# Patient Record
Sex: FEMALE | Race: WHITE
[De-identification: names, ages, dates, MRNs, and addresses within clinical notes are randomized per-mention and may not be internally consistent; named-entity substitution may affect disease eponyms.]

---

## 2024-06-17 ENCOUNTER — APPOINTMENT (OUTPATIENT)
Dept: COLORECTAL SURGERY | Facility: CLINIC | Age: 49
End: 2024-06-17
Payer: COMMERCIAL

## 2024-06-17 VITALS
DIASTOLIC BLOOD PRESSURE: 73 MMHG | TEMPERATURE: 98 F | SYSTOLIC BLOOD PRESSURE: 140 MMHG | WEIGHT: 110 LBS | OXYGEN SATURATION: 99 % | BODY MASS INDEX: 20.24 KG/M2 | HEIGHT: 62 IN | RESPIRATION RATE: 16 BRPM | HEART RATE: 79 BPM

## 2024-06-17 DIAGNOSIS — Z78.9 OTHER SPECIFIED HEALTH STATUS: ICD-10-CM

## 2024-06-17 DIAGNOSIS — L29.0 PRURITUS ANI: ICD-10-CM

## 2024-06-17 DIAGNOSIS — K62.89 OTHER SPECIFIED DISEASES OF ANUS AND RECTUM: ICD-10-CM

## 2024-06-17 DIAGNOSIS — K60.2 ANAL FISSURE, UNSPECIFIED: ICD-10-CM

## 2024-06-17 PROBLEM — Z00.00 ENCOUNTER FOR PREVENTIVE HEALTH EXAMINATION: Status: ACTIVE | Noted: 2024-06-17

## 2024-06-17 PROCEDURE — 99205 OFFICE O/P NEW HI 60 MIN: CPT | Mod: 25

## 2024-06-17 PROCEDURE — 46600 DIAGNOSTIC ANOSCOPY SPX: CPT

## 2024-06-17 RX ORDER — HYDROCORTISONE 25 MG/G
2.5 CREAM TOPICAL
Qty: 1 | Refills: 3 | Status: ACTIVE | COMMUNITY
Start: 2024-06-17 | End: 1900-01-01

## 2024-06-17 NOTE — HISTORY OF PRESENT ILLNESS
[FreeTextEntry1] : 48 year old female presents with rectal pain x1 year. She states the pain is intermittent but has been severe the last couple of weeks. Patient states the pain is aggravated if she touches the area. She also reports burning if urine reaches the area. She thinks there may also be swelling. Patient denies any bleeding. She denies any constipation. +daily BM.   No history of colonoscopy. Patient denies family history of colon/rectal cancer, IBD.

## 2024-06-17 NOTE — ASSESSMENT
[FreeTextEntry1] : 48-year-old female was never had a colonoscopy before presents with anterior anal/perineal body irritation of a chronic nature. On physical examination there is a erythematous skin tag anteriorly as well as a small possibly acute anal fissure.  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 She has never had an outbreak.  Anterior anal/perineal irritation of uncertain etiology.  Recommend checking sensation, Proctosol cream 3 times daily for 2 weeks, fiber supplementation, followup in one month  I had an extensive discussion of the risks and benefits of performing a colonoscopy with the patient.  I explained the risks, which include but are not limited to, bleeding, infection, as well as perforation requiring emergent operative intervention and likely a colostomy. the patient understands these risks and is willing to proceed with their colonoscopy.

## 2024-06-17 NOTE — PHYSICAL EXAM
[Normal Breath Sounds] : Normal breath sounds [Normal Heart Sounds] : normal heart sounds [Normal Rate and Rhythm] : normal rate and rhythm [No Rash or Lesion] : No rash or lesion [Alert] : alert [Oriented to Person] : oriented to person [Oriented to Place] : oriented to place [Oriented to Time] : oriented to time [Calm] : calm [Normal rectal exam] : exam was normal [Anterior] : anteriorly [Excoriation] : excoriations [Multiple Sinus Tracts] : no perianal sinus tracts [Fistula] : no fistulas [Wart] : no warts [Ulcer ___ cm] : no ulcers [Pilonidal Cyst] : no pilonidal cysts [Pilonidal Sinus] : no pilonidal sinus [Pilonidal Sinus Draining] : no pilonidal sinus drainage [Tender, Swollen] : nontender, non-swollen [Thrombosed] : that was not thrombosed [Skin Tags] : residual hemorrhoidal skin tags were noted [Normal] : was normal [de-identified] : soft, NT/ND, +BS [de-identified] : anterior skin tag / perianal irritation, +acute fissure  [de-identified] : NC/AT [de-identified] : well nourished female [de-identified] : ESTHER/+ROM [de-identified] : intact

## 2024-06-25 ENCOUNTER — APPOINTMENT (OUTPATIENT)
Dept: COLORECTAL SURGERY | Facility: CLINIC | Age: 49
End: 2024-06-25

## 2024-11-14 ENCOUNTER — APPOINTMENT (OUTPATIENT)
Dept: COLORECTAL SURGERY | Facility: CLINIC | Age: 49
End: 2024-11-14
Payer: COMMERCIAL

## 2024-11-14 DIAGNOSIS — K62.89 OTHER SPECIFIED DISEASES OF ANUS AND RECTUM: ICD-10-CM

## 2024-11-14 DIAGNOSIS — K60.2 ANAL FISSURE, UNSPECIFIED: ICD-10-CM

## 2024-11-14 PROCEDURE — 45378 DIAGNOSTIC COLONOSCOPY: CPT
